# Patient Record
Sex: MALE | Race: WHITE | NOT HISPANIC OR LATINO | Employment: OTHER | URBAN - METROPOLITAN AREA
[De-identification: names, ages, dates, MRNs, and addresses within clinical notes are randomized per-mention and may not be internally consistent; named-entity substitution may affect disease eponyms.]

---

## 2024-03-15 ENCOUNTER — HOSPITAL ENCOUNTER (OUTPATIENT)
Facility: MEDICAL CENTER | Age: 54
End: 2024-03-16
Attending: EMERGENCY MEDICINE | Admitting: STUDENT IN AN ORGANIZED HEALTH CARE EDUCATION/TRAINING PROGRAM

## 2024-03-15 ENCOUNTER — APPOINTMENT (OUTPATIENT)
Dept: RADIOLOGY | Facility: MEDICAL CENTER | Age: 54
End: 2024-03-15
Attending: EMERGENCY MEDICINE

## 2024-03-15 DIAGNOSIS — N20.0 KIDNEY STONE: ICD-10-CM

## 2024-03-15 DIAGNOSIS — N13.2 HYDRONEPHROSIS WITH URINARY OBSTRUCTION DUE TO URETERAL CALCULUS: ICD-10-CM

## 2024-03-15 LAB
ALBUMIN SERPL BCP-MCNC: 5.1 G/DL (ref 3.2–4.9)
ALBUMIN/GLOB SERPL: 1.6 G/DL
ALP SERPL-CCNC: 67 U/L (ref 30–99)
ALT SERPL-CCNC: 10 U/L (ref 2–50)
ANION GAP SERPL CALC-SCNC: 19 MMOL/L (ref 7–16)
APPEARANCE UR: CLEAR
AST SERPL-CCNC: 21 U/L (ref 12–45)
BACTERIA #/AREA URNS HPF: NEGATIVE /HPF
BASOPHILS # BLD AUTO: 0.3 % (ref 0–1.8)
BASOPHILS # BLD: 0.04 K/UL (ref 0–0.12)
BILIRUB SERPL-MCNC: 0.8 MG/DL (ref 0.1–1.5)
BILIRUB UR QL STRIP.AUTO: NEGATIVE
BUN SERPL-MCNC: 21 MG/DL (ref 8–22)
CALCIUM ALBUM COR SERPL-MCNC: 9.3 MG/DL (ref 8.5–10.5)
CALCIUM SERPL-MCNC: 10.2 MG/DL (ref 8.5–10.5)
CHLORIDE SERPL-SCNC: 105 MMOL/L (ref 96–112)
CO2 SERPL-SCNC: 20 MMOL/L (ref 20–33)
COLOR UR: YELLOW
CREAT SERPL-MCNC: 1.4 MG/DL (ref 0.5–1.4)
EOSINOPHIL # BLD AUTO: 0 K/UL (ref 0–0.51)
EOSINOPHIL NFR BLD: 0 % (ref 0–6.9)
EPI CELLS #/AREA URNS HPF: NEGATIVE /HPF
ERYTHROCYTE [DISTWIDTH] IN BLOOD BY AUTOMATED COUNT: 40 FL (ref 35.9–50)
GFR SERPLBLD CREATININE-BSD FMLA CKD-EPI: 60 ML/MIN/1.73 M 2
GLOBULIN SER CALC-MCNC: 3.1 G/DL (ref 1.9–3.5)
GLUCOSE SERPL-MCNC: 165 MG/DL (ref 65–99)
GLUCOSE UR STRIP.AUTO-MCNC: NEGATIVE MG/DL
HCT VFR BLD AUTO: 50.7 % (ref 42–52)
HGB BLD-MCNC: 17.3 G/DL (ref 14–18)
HYALINE CASTS #/AREA URNS LPF: ABNORMAL /LPF
IMM GRANULOCYTES # BLD AUTO: 0.05 K/UL (ref 0–0.11)
IMM GRANULOCYTES NFR BLD AUTO: 0.3 % (ref 0–0.9)
KETONES UR STRIP.AUTO-MCNC: 80 MG/DL
LEUKOCYTE ESTERASE UR QL STRIP.AUTO: ABNORMAL
LIPASE SERPL-CCNC: 13 U/L (ref 11–82)
LYMPHOCYTES # BLD AUTO: 1.41 K/UL (ref 1–4.8)
LYMPHOCYTES NFR BLD: 9 % (ref 22–41)
MCH RBC QN AUTO: 29 PG (ref 27–33)
MCHC RBC AUTO-ENTMCNC: 34.1 G/DL (ref 32.3–36.5)
MCV RBC AUTO: 84.9 FL (ref 81.4–97.8)
MICRO URNS: ABNORMAL
MONOCYTES # BLD AUTO: 0.49 K/UL (ref 0–0.85)
MONOCYTES NFR BLD AUTO: 3.1 % (ref 0–13.4)
MUCOUS THREADS #/AREA URNS HPF: ABNORMAL /HPF
NEUTROPHILS # BLD AUTO: 13.61 K/UL (ref 1.82–7.42)
NEUTROPHILS NFR BLD: 87.3 % (ref 44–72)
NITRITE UR QL STRIP.AUTO: NEGATIVE
NRBC # BLD AUTO: 0 K/UL
NRBC BLD-RTO: 0 /100 WBC (ref 0–0.2)
PH UR STRIP.AUTO: 6 [PH] (ref 5–8)
PLATELET # BLD AUTO: 413 K/UL (ref 164–446)
PMV BLD AUTO: 9.7 FL (ref 9–12.9)
POTASSIUM SERPL-SCNC: 4.3 MMOL/L (ref 3.6–5.5)
PROT SERPL-MCNC: 8.2 G/DL (ref 6–8.2)
PROT UR QL STRIP: 30 MG/DL
RBC # BLD AUTO: 5.97 M/UL (ref 4.7–6.1)
RBC # URNS HPF: ABNORMAL /HPF
RBC UR QL AUTO: ABNORMAL
SODIUM SERPL-SCNC: 144 MMOL/L (ref 135–145)
SP GR UR STRIP.AUTO: 1.02
TRANS CELLS #/AREA URNS HPF: ABNORMAL /HPF
UROBILINOGEN UR STRIP.AUTO-MCNC: 0.2 MG/DL
WBC # BLD AUTO: 15.6 K/UL (ref 4.8–10.8)
WBC #/AREA URNS HPF: ABNORMAL /HPF

## 2024-03-15 PROCEDURE — 700105 HCHG RX REV CODE 258: Performed by: EMERGENCY MEDICINE

## 2024-03-15 PROCEDURE — 93005 ELECTROCARDIOGRAM TRACING: CPT | Performed by: STUDENT IN AN ORGANIZED HEALTH CARE EDUCATION/TRAINING PROGRAM

## 2024-03-15 PROCEDURE — 74176 CT ABD & PELVIS W/O CONTRAST: CPT

## 2024-03-15 PROCEDURE — G0378 HOSPITAL OBSERVATION PER HR: HCPCS

## 2024-03-15 PROCEDURE — 99222 1ST HOSP IP/OBS MODERATE 55: CPT | Performed by: STUDENT IN AN ORGANIZED HEALTH CARE EDUCATION/TRAINING PROGRAM

## 2024-03-15 PROCEDURE — 83690 ASSAY OF LIPASE: CPT

## 2024-03-15 PROCEDURE — 85025 COMPLETE CBC W/AUTO DIFF WBC: CPT

## 2024-03-15 PROCEDURE — 80053 COMPREHEN METABOLIC PANEL: CPT

## 2024-03-15 PROCEDURE — 700111 HCHG RX REV CODE 636 W/ 250 OVERRIDE (IP): Mod: JZ | Performed by: EMERGENCY MEDICINE

## 2024-03-15 PROCEDURE — 700105 HCHG RX REV CODE 258: Performed by: STUDENT IN AN ORGANIZED HEALTH CARE EDUCATION/TRAINING PROGRAM

## 2024-03-15 PROCEDURE — 36415 COLL VENOUS BLD VENIPUNCTURE: CPT

## 2024-03-15 PROCEDURE — 96374 THER/PROPH/DIAG INJ IV PUSH: CPT

## 2024-03-15 PROCEDURE — 99285 EMERGENCY DEPT VISIT HI MDM: CPT

## 2024-03-15 PROCEDURE — 81001 URINALYSIS AUTO W/SCOPE: CPT

## 2024-03-15 PROCEDURE — 96375 TX/PRO/DX INJ NEW DRUG ADDON: CPT

## 2024-03-15 RX ORDER — MORPHINE SULFATE 4 MG/ML
4 INJECTION INTRAVENOUS ONCE
Status: COMPLETED | OUTPATIENT
Start: 2024-03-15 | End: 2024-03-15

## 2024-03-15 RX ORDER — SODIUM CHLORIDE, SODIUM LACTATE, POTASSIUM CHLORIDE, CALCIUM CHLORIDE 600; 310; 30; 20 MG/100ML; MG/100ML; MG/100ML; MG/100ML
1000 INJECTION, SOLUTION INTRAVENOUS ONCE
Status: COMPLETED | OUTPATIENT
Start: 2024-03-15 | End: 2024-03-15

## 2024-03-15 RX ORDER — SODIUM CHLORIDE 9 MG/ML
INJECTION, SOLUTION INTRAVENOUS CONTINUOUS
Status: DISCONTINUED | OUTPATIENT
Start: 2024-03-15 | End: 2024-03-16 | Stop reason: HOSPADM

## 2024-03-15 RX ORDER — KETOROLAC TROMETHAMINE 15 MG/ML
15 INJECTION, SOLUTION INTRAMUSCULAR; INTRAVENOUS ONCE
Status: COMPLETED | OUTPATIENT
Start: 2024-03-15 | End: 2024-03-15

## 2024-03-15 RX ORDER — ONDANSETRON 2 MG/ML
4 INJECTION INTRAMUSCULAR; INTRAVENOUS ONCE
Status: COMPLETED | OUTPATIENT
Start: 2024-03-15 | End: 2024-03-15

## 2024-03-15 RX ORDER — ONDANSETRON 2 MG/ML
4 INJECTION INTRAMUSCULAR; INTRAVENOUS EVERY 4 HOURS PRN
Status: DISCONTINUED | OUTPATIENT
Start: 2024-03-15 | End: 2024-03-16 | Stop reason: HOSPADM

## 2024-03-15 RX ORDER — MORPHINE SULFATE 4 MG/ML
4 INJECTION INTRAVENOUS EVERY 4 HOURS PRN
Status: DISCONTINUED | OUTPATIENT
Start: 2024-03-15 | End: 2024-03-16 | Stop reason: HOSPADM

## 2024-03-15 RX ORDER — ACETAMINOPHEN 325 MG/1
650 TABLET ORAL EVERY 6 HOURS PRN
Status: DISCONTINUED | OUTPATIENT
Start: 2024-03-15 | End: 2024-03-16 | Stop reason: HOSPADM

## 2024-03-15 RX ADMIN — SODIUM CHLORIDE: 9 INJECTION, SOLUTION INTRAVENOUS at 23:39

## 2024-03-15 RX ADMIN — SODIUM CHLORIDE, POTASSIUM CHLORIDE, SODIUM LACTATE AND CALCIUM CHLORIDE 1000 ML: 600; 310; 30; 20 INJECTION, SOLUTION INTRAVENOUS at 18:25

## 2024-03-15 RX ADMIN — MORPHINE SULFATE 4 MG: 4 INJECTION, SOLUTION INTRAMUSCULAR; INTRAVENOUS at 18:32

## 2024-03-15 RX ADMIN — KETOROLAC TROMETHAMINE 15 MG: 15 INJECTION, SOLUTION INTRAMUSCULAR; INTRAVENOUS at 19:43

## 2024-03-15 RX ADMIN — ONDANSETRON 4 MG: 2 INJECTION INTRAMUSCULAR; INTRAVENOUS at 18:31

## 2024-03-15 ASSESSMENT — ENCOUNTER SYMPTOMS
NEUROLOGICAL NEGATIVE: 1
VOMITING: 1
CARDIOVASCULAR NEGATIVE: 1
RESPIRATORY NEGATIVE: 1
FLANK PAIN: 1
PSYCHIATRIC NEGATIVE: 1
ABDOMINAL PAIN: 0
NAUSEA: 1
CONSTITUTIONAL NEGATIVE: 1

## 2024-03-15 ASSESSMENT — LIFESTYLE VARIABLES
HOW MANY TIMES IN THE PAST YEAR HAVE YOU HAD 5 OR MORE DRINKS IN A DAY: 0
AVERAGE NUMBER OF DAYS PER WEEK YOU HAVE A DRINK CONTAINING ALCOHOL: 0
TOTAL SCORE: 0
TOTAL SCORE: 0
HAVE YOU EVER FELT YOU SHOULD CUT DOWN ON YOUR DRINKING: NO
EVER HAD A DRINK FIRST THING IN THE MORNING TO STEADY YOUR NERVES TO GET RID OF A HANGOVER: NO
DOES PATIENT WANT TO STOP DRINKING: NO
CONSUMPTION TOTAL: NEGATIVE
EVER FELT BAD OR GUILTY ABOUT YOUR DRINKING: NO
ALCOHOL_USE: YES
ON A TYPICAL DAY WHEN YOU DRINK ALCOHOL HOW MANY DRINKS DO YOU HAVE: 0
HAVE PEOPLE ANNOYED YOU BY CRITICIZING YOUR DRINKING: NO
TOTAL SCORE: 0

## 2024-03-15 ASSESSMENT — COGNITIVE AND FUNCTIONAL STATUS - GENERAL
DAILY ACTIVITIY SCORE: 24
SUGGESTED CMS G CODE MODIFIER MOBILITY: CH
MOBILITY SCORE: 24
SUGGESTED CMS G CODE MODIFIER DAILY ACTIVITY: CH

## 2024-03-15 ASSESSMENT — PATIENT HEALTH QUESTIONNAIRE - PHQ9
SUM OF ALL RESPONSES TO PHQ9 QUESTIONS 1 AND 2: 0
1. LITTLE INTEREST OR PLEASURE IN DOING THINGS: NOT AT ALL
2. FEELING DOWN, DEPRESSED, IRRITABLE, OR HOPELESS: NOT AT ALL

## 2024-03-15 ASSESSMENT — FIBROSIS 4 INDEX: FIB4 SCORE: 0.84

## 2024-03-15 ASSESSMENT — PAIN DESCRIPTION - PAIN TYPE
TYPE: ACUTE PAIN
TYPE: ACUTE PAIN

## 2024-03-15 NOTE — ED TRIAGE NOTES
.Teo Gauthier  .  Chief Complaint   Patient presents with    Abdominal Pain     Patient c/o RLQ pain radiating to back x 1 day.     N/V     X 1 day     Patient to triage with above complaint. Patient appears uncomfortable in triage.   Urine sample sent to lab.     Patient to lobby and instructed to inform staff of any needs.

## 2024-03-16 ENCOUNTER — PHARMACY VISIT (OUTPATIENT)
Dept: PHARMACY | Facility: MEDICAL CENTER | Age: 54
End: 2024-03-16
Payer: COMMERCIAL

## 2024-03-16 VITALS
HEIGHT: 72 IN | RESPIRATION RATE: 16 BRPM | WEIGHT: 158.07 LBS | OXYGEN SATURATION: 94 % | SYSTOLIC BLOOD PRESSURE: 102 MMHG | HEART RATE: 58 BPM | BODY MASS INDEX: 21.41 KG/M2 | DIASTOLIC BLOOD PRESSURE: 64 MMHG | TEMPERATURE: 98.6 F

## 2024-03-16 LAB — EKG IMPRESSION: NORMAL

## 2024-03-16 PROCEDURE — 99243 OFF/OP CNSLTJ NEW/EST LOW 30: CPT | Performed by: UROLOGY

## 2024-03-16 PROCEDURE — G0378 HOSPITAL OBSERVATION PER HR: HCPCS

## 2024-03-16 PROCEDURE — 93010 ELECTROCARDIOGRAM REPORT: CPT | Performed by: INTERNAL MEDICINE

## 2024-03-16 PROCEDURE — 99239 HOSP IP/OBS DSCHRG MGMT >30: CPT | Mod: GC | Performed by: INTERNAL MEDICINE

## 2024-03-16 PROCEDURE — RXMED WILLOW AMBULATORY MEDICATION CHARGE

## 2024-03-16 RX ORDER — TAMSULOSIN HYDROCHLORIDE 0.4 MG/1
0.4 CAPSULE ORAL DAILY
Qty: 30 CAPSULE | Refills: 0 | Status: SHIPPED | OUTPATIENT
Start: 2024-03-16

## 2024-03-16 RX ORDER — KETOROLAC TROMETHAMINE 10 MG/1
10 TABLET, FILM COATED ORAL EVERY 6 HOURS PRN
Qty: 20 TABLET | Refills: 0 | Status: SHIPPED | OUTPATIENT
Start: 2024-03-16 | End: 2024-03-21

## 2024-03-16 ASSESSMENT — PAIN DESCRIPTION - PAIN TYPE: TYPE: ACUTE PAIN

## 2024-03-16 NOTE — DISCHARGE INSTRUCTIONS
Discharge Instructions per OZZIE White.     -Start taking 0.4 mg tamsulosin p.o. daily.  -Take Toradol 10 mg p.o. every 6 hours as needed for pain.  -Urology should be calling you on Monday to schedule outpatient surgery.  -Use filter basket for urine in case you pass a stone.  If you are able to pass the stone keep the stone and give to urology team to be sent off to pathology.  -Stay well-hydrated.  -If you are unable to void call the urology team and/or come to the emergency department.     DIET: As tolerated     ACTIVITY: Tolerated     DIAGNOSIS: Right flank pain     Return to ER if you experience any numbness and tingling, palpitations, chest pain, or shortness of breath.

## 2024-03-16 NOTE — CARE PLAN
The patient is Stable - Low risk of patient condition declining or worsening    Shift Goals  Clinical Goals: Surgery, pain control  Patient Goals: surgery, DC  Family Goals: NA    Progress made toward(s) clinical / shift goals:    Problem: Pain - Standard  Goal: Alleviation of pain or a reduction in pain to the patient’s comfort goal  Description: Target End Date:  Prior to discharge or change in level of care    Document on Vitals flowsheet    1.  Document pain using the appropriate pain scale per order or unit policy  2.  Educate and implement non-pharmacologic comfort measures (i.e. relaxation, distraction, massage, cold/heat therapy, etc.)  3.  Pain management medications as ordered  4.  Reassess pain after pain med administration per policy  5.  If opiods administered assess patient's response to pain medication is appropriate per POSS sedation scale  6.  Follow pain management plan developed in collaboration with patient and interdisciplinary team (including palliative care or pain specialists if applicable)  Outcome: Progressing       Patient is not progressing towards the following goals:

## 2024-03-16 NOTE — HOSPITAL COURSE
Judson Gauthier is a 53 y.o. male who presented 3/15/2024 with right flank pain.      For the last year, patient has been having on and off right flank pain that he describes as sharp and mild.  He has never been evaluated for his right flank pain.  For the last few days, patient noted to have worsening right flank pain, and this morning patient noted to have been nauseous and vomited several times due to the pain.  He decided to come to ER for evaluation. Patient denies any medical history.  He occasionally drinks a beer, denies taking any medications, denies tobacco, denies illicit drug use.     In ER, patient found to have white blood cell count 15,000.  UA negative for UTI.  CAT scan showing 7.8 mm nephrolithiasis resulting in obstructive changes.  Urology consulted.    Patient seen and examined and in no apparent distress.  He states that he is feeling much better with no pain.  Urology came to see the patient today and discussed medical expulsive therapy, ureteral stenting, and laser lithotripsy.  It was decided by the patient that they would discharge today and schedule outpatient for elective cystoscopy/ureteroscopic/laser lithotripsy.  Surgery to call patient on Monday to schedule surgery.    Discussed with the patient that he will be going home on Flomax 0.4 mg p.o. daily and p.o.Toradol 10 mg every 6 hours for mild to moderate pain.  All questions answered at this time and patient states understanding.  Patient will be discharged in good standing.

## 2024-03-16 NOTE — ED PROVIDER NOTES
ER Provider Note    Scribed for Billy Apple M.d. by Isidro Lerma. 3/15/2024  6:20 PM    Primary Care Provider: Pcp Pt States None    CHIEF COMPLAINT  Chief Complaint   Patient presents with    Abdominal Pain     Patient c/o RLQ pain radiating to back x 1 day.     N/V     X 1 day         HPI/ROS    OUTSIDE HISTORIAN(S):  Family was present at bedside but did not provide information about the patient.    Teo Gauthier is a 53 y.o. male who presents to the ED complaining of abdominal pain onset yesterday. Patient ate a protein pancake when he began to feel the pain. He reports that the pain is in the right lower quadrant. Patient does not have pain, burning, or blood when he urinates. Patient has vomiting since yesterday. Denies fever. He believes he has had a kidney stone before but does not remember exactly. He has not had surgery in his abdomen before. Patient attempted to take Ibuprofen 800 mg but vomited soon after taking it.    PAST MEDICAL HISTORY  History reviewed. No pertinent past medical history.    SURGICAL HISTORY  History reviewed. No pertinent surgical history.    FAMILY HISTORY  History reviewed. No pertinent family history.    SOCIAL HISTORY   reports that he has never smoked. He has never used smokeless tobacco. He reports current drug use. Drug: Intravenous. He reports that he does not drink alcohol.    CURRENT MEDICATIONS  Previous Medications    No medications noted.     ALLERGIES  Patient has no known allergies.    PHYSICAL EXAM  /80   Pulse (!) 58   Temp 36.4 °C (97.6 °F) (Temporal)   Resp 17   Wt 80.3 kg (177 lb)   SpO2 99% Comment: RA      Constitutional: Alert. Uncomfortable appearing.  HENT: No signs of trauma, Bilateral external ears normal, Nose normal. Uvula midline.   Eyes: Pupils are equal and reactive, Conjunctiva normal, Non-icteric.   Neck: Normal range of motion, No tenderness, Supple, No stridor.   Lymphatic: No lymphadenopathy noted.   Cardiovascular: Regular  rate and rhythm, no murmurs.   Thorax & Lungs: Normal breath sounds, No respiratory distress, No wheezing, No chest tenderness.   Abdomen: Bowel sounds normal, Soft, No peritoneal signs, No masses, No pulsatile masses. Right lower quadrant tenderness.  Skin: Warm, Dry, No erythema, No rash.   Back: No bony tenderness, No CVA tenderness.   Extremities: Intact distal pulses, No edema, No tenderness, No cyanosis.  Musculoskeletal: Good range of motion in all major joints. No tenderness to palpation or major deformities noted.   Neurologic: Alert , Normal motor function, Normal sensory function, No focal deficits noted.   Psychiatric: Affect normal, Judgment normal, Mood normal.     DIAGNOSTIC STUDIES    Labs:   Results for orders placed or performed during the hospital encounter of 03/15/24   CBC WITH DIFFERENTIAL   Result Value Ref Range    WBC 15.6 (H) 4.8 - 10.8 K/uL    RBC 5.97 4.70 - 6.10 M/uL    Hemoglobin 17.3 14.0 - 18.0 g/dL    Hematocrit 50.7 42.0 - 52.0 %    MCV 84.9 81.4 - 97.8 fL    MCH 29.0 27.0 - 33.0 pg    MCHC 34.1 32.3 - 36.5 g/dL    RDW 40.0 35.9 - 50.0 fL    Platelet Count 413 164 - 446 K/uL    MPV 9.7 9.0 - 12.9 fL    Neutrophils-Polys 87.30 (H) 44.00 - 72.00 %    Lymphocytes 9.00 (L) 22.00 - 41.00 %    Monocytes 3.10 0.00 - 13.40 %    Eosinophils 0.00 0.00 - 6.90 %    Basophils 0.30 0.00 - 1.80 %    Immature Granulocytes 0.30 0.00 - 0.90 %    Nucleated RBC 0.00 0.00 - 0.20 /100 WBC    Neutrophils (Absolute) 13.61 (H) 1.82 - 7.42 K/uL    Lymphs (Absolute) 1.41 1.00 - 4.80 K/uL    Monos (Absolute) 0.49 0.00 - 0.85 K/uL    Eos (Absolute) 0.00 0.00 - 0.51 K/uL    Baso (Absolute) 0.04 0.00 - 0.12 K/uL    Immature Granulocytes (abs) 0.05 0.00 - 0.11 K/uL    NRBC (Absolute) 0.00 K/uL   COMP METABOLIC PANEL   Result Value Ref Range    Sodium 144 135 - 145 mmol/L    Potassium 4.3 3.6 - 5.5 mmol/L    Chloride 105 96 - 112 mmol/L    Co2 20 20 - 33 mmol/L    Anion Gap 19.0 (H) 7.0 - 16.0    Glucose 165 (H) 65  - 99 mg/dL    Bun 21 8 - 22 mg/dL    Creatinine 1.40 0.50 - 1.40 mg/dL    Calcium 10.2 8.5 - 10.5 mg/dL    Correct Calcium 9.3 8.5 - 10.5 mg/dL    AST(SGOT) 21 12 - 45 U/L    ALT(SGPT) 10 2 - 50 U/L    Alkaline Phosphatase 67 30 - 99 U/L    Total Bilirubin 0.8 0.1 - 1.5 mg/dL    Albumin 5.1 (H) 3.2 - 4.9 g/dL    Total Protein 8.2 6.0 - 8.2 g/dL    Globulin 3.1 1.9 - 3.5 g/dL    A-G Ratio 1.6 g/dL   LIPASE   Result Value Ref Range    Lipase 13 11 - 82 U/L   URINALYSIS    Specimen: Urine   Result Value Ref Range    Color Yellow     Character Clear     Specific Gravity 1.025 <1.035    Ph 6.0 5.0 - 8.0    Glucose Negative Negative mg/dL    Ketones 80 (A) Negative mg/dL    Protein 30 (A) Negative mg/dL    Bilirubin Negative Negative    Urobilinogen, Urine 0.2 Negative    Nitrite Negative Negative    Leukocyte Esterase Small (A) Negative    Occult Blood Small (A) Negative    Micro Urine Req Microscopic    URINE MICROSCOPIC (W/UA)   Result Value Ref Range    WBC 5-10 (A) /hpf    RBC 10-20 (A) /hpf    Bacteria Negative None /hpf    Epithelial Cells Negative /hpf    Trans Epithelial Cells Rare /hpf    Mucous Threads Many /hpf    Hyaline Cast 3-5 (A) /lpf   ESTIMATED GFR   Result Value Ref Range    GFR (CKD-EPI) 60 >60 mL/min/1.73 m 2     Radiology:   The attending emergency physician has independently interpreted the diagnostic imaging associated with this visit and am waiting the final reading from the radiologist.   Preliminary interpretation is a follows: +renal stone and hydro  Radiologist interpretation:   CT-RENAL COLIC EVALUATION(A/P W/O)   Final Result         1.  7.8 mm proximal right ureteral stone resulting in right urinary outflow tract obstructive changes.   2.  Small fat-containing umbilical hernia   3.  Diverticulosis        COURSE & MEDICAL DECISION MAKING         6:30 PM - Patient seen and examined at bedside for abdominal pain. Discussed plan of care, including imaging, lab work, and medication. Patient  agrees to the plan of care. The patient will be medicated with LR bolus, Morphine 4mg/ml injection 4 mg, and Zofran 4 mg injection. Ordered for CT-Abdomen-Pelvis With, CBC w/ Diff, CMP, Lipase, UA, and Urine Microscopic to evaluate his symptoms.     6:46 PM - Paged Radiologist to change CT With to CT Renal Colic.      INITIAL ASSESSMENT, COURSE AND PLAN  Care Narrative:   53-year-old male with significant hydronephrosis with large ureteral stone with 1 out of 3 chance of passing stone without surgical intervention with elevated creatinine at 1.4 today and significant amount of pain  Given concern for injury to kidney and lack of anticipated passage plan for admission and lithotripsy  I discussed this case with on-call  Urologist Dr. Barnhart who agrees n.p.o. at midnight and plan for OR in a.m.  Patient to be admitted to Dr. Apple in guarded condition    DISPOSITION AND DISCUSSIONS  I have discussed management of the patient with the following physicians and WILLIAM's:  Ambika Henning    Discussion of management with other [facility] or appropriate source(s): Radiologist Change CT order          FINAL DIANGOSIS  1. Kidney stone    2. Hydronephrosis with urinary obstruction due to ureteral calculus        Isidro DEL VALLE (Scribe), am scribing for, and in the presence of, Azael Gil M.D..    Electronically signed by: Isidro Lerma (Scribe), 3/15/2024    Azael DEL VALLE M.D. personally performed the services described in this documentation, as scribed by Isidro Lerma in my presence, and it is both accurate and complete.    The note accurately reflects work and decisions made by me.  Azael Gil M.D.  3/15/2024  11:11 PM

## 2024-03-16 NOTE — CONSULTS
Chief Complaint: right flank pain    HPI: Judson Gauthier is a 53 y.o. male in good overall health, who presented to the ED last night with acute right flank pain and nausea. He was admitted for pain control.    This morning, he feels much improved. No pain, nausea, or vomiting. No fevers or chills. Urinating without dysuria, frequency, or urgency. Has not required prn analgesia.     He says that he has had more mild episodes like this over the last 7 years, and may have spontaneously passed stones before. This is the first time he came to the hospital for such pain.      Symptoms:  Frequency: no  Urgency: no  Nocturia: no  Stress incontinence: no  Urge incontinence: no  Dysuria: no  Gross hematuria: no  Weakened stream: no  Strain to empty: no      Past Medical History:  History reviewed. No pertinent past medical history.    Past Surgical History:  History reviewed. No pertinent surgical history.    Family History:  History reviewed. No pertinent family history.    Social History:  Social History     Socioeconomic History    Marital status: Single     Spouse name: Not on file    Number of children: Not on file    Years of education: Not on file    Highest education level: Not on file   Occupational History    Not on file   Tobacco Use    Smoking status: Never    Smokeless tobacco: Never   Vaping Use    Vaping Use: Never used   Substance and Sexual Activity    Alcohol use: No    Drug use: Yes     Types: Intravenous     Comment: marijuana    Sexual activity: Not on file   Other Topics Concern    Not on file   Social History Narrative    Not on file     Social Determinants of Health     Financial Resource Strain: Not on file   Food Insecurity: Not on file   Transportation Needs: Not on file   Physical Activity: Not on file   Stress: Not on file   Social Connections: Not on file   Intimate Partner Violence: Not on file   Housing Stability: Not on file       Medications:  Current Facility-Administered Medications  "  Medication Dose Route Frequency Provider Last Rate Last Admin    NS infusion   Intravenous Continuous Billy Apple M.D. 100 mL/hr at 03/15/24 2339 New Bag at 03/15/24 2339    acetaminophen (Tylenol) tablet 650 mg  650 mg Oral Q6HRS PRN Billy Apple M.D.        morphine 4 MG/ML injection 4 mg  4 mg Intravenous Q4HRS PRN Billy Apple M.D.        ondansetron (Zofran) syringe/vial injection 4 mg  4 mg Intravenous Q4HRS PRN Billy Apple M.D.           Allergies:  No Known Allergies    Review of Systems:  Constitutional: Negative for fever, chills and malaise/fatigue.   HENT: Negative for congestion.    Eyes: Negative for pain.   Respiratory: Negative for cough and shortness of breath.    Cardiovascular: Negative for leg swelling.   Gastrointestinal: Negative for nausea, vomiting, abdominal pain and diarrhea.   Genitourinary: Negative for dysuria and hematuria.   Skin: Negative for rash.   Neurological: Negative for dizziness, focal weakness and headaches.   Endo/Heme/Allergies: Does not bruise/bleed easily.   Psychiatric/Behavioral: Negative for depression.  The patient is not nervous/anxious.        Physical Exam:  Vitals:    03/16/24 0656   BP: 102/64   Pulse: (!) 58   Resp: 16   Temp: 37 °C (98.6 °F)   SpO2: 94%       GENERAL: well appearing, well nourished, NAD  RESP: respiratory effort normal  ABDOMEN: soft, nontender, nondistended, no masses or organomegaly  HERNIAS: no hernias found on exam  SKIN/LYMPH: normal coloration and turgor, no suspicious skin lesions noted  NEURO/PSYCH: alert, oriented, normal speech, no focal findings or movement disorder noted  EXTREMITIES: peripheral pulses normal, no pedal edema, no clubbing or cyanosis      Data Review:    Labs:  POCT UA No results found for: \"POCCOLOR\", \"POCAPPEAR\", \"POCLEUKEST\", \"POCNITRITE\", \"POCUROBILIGE\", \"POCPROTEIN\", \"POCURPH\", \"POCBLOOD\", \"POCSPGRV\", \"POCKETONES\", \"POCBILIRUBIN\", \"POCGLUCUA\"   CBC   Lab Results   Component Value " Date/Time    WBC 15.6 (H) 03/15/2024 1605    RBC 5.97 03/15/2024 1605    HEMOGLOBIN 17.3 03/15/2024 1605    HEMATOCRIT 50.7 03/15/2024 1605    MCV 84.9 03/15/2024 1605    MCH 29.0 03/15/2024 1605    MCHC 34.1 03/15/2024 1605    RDW 40.0 03/15/2024 1605    MPV 9.7 03/15/2024 1605    LYMPHOCYTES 9.00 (L) 03/15/2024 1605    LYMPHS 1.41 03/15/2024 1605    MONOCYTES 3.10 03/15/2024 1605    MONOS 0.49 03/15/2024 1605    EOSINOPHILS 0.00 03/15/2024 1605    EOS 0.00 03/15/2024 1605    BASOPHILS 0.30 03/15/2024 1605    BASO 0.04 03/15/2024 1605    NRBC 0.00 03/15/2024 1605     CMP   Lab Results   Component Value Date/Time    SODIUM 144 03/15/2024 1605    POTASSIUM 4.3 03/15/2024 1605    CHLORIDE 105 03/15/2024 1605    CO2 20 03/15/2024 1605    ANION 19.0 (H) 03/15/2024 1605    GLUCOSE 165 (H) 03/15/2024 1605    BUN 21 03/15/2024 1605    CREATININE 1.40 03/15/2024 1605    GFRCKD 60 03/15/2024 1605    CALCIUM 10.2 03/15/2024 1605    CORRCALC 9.3 03/15/2024 1605    ASTSGOT 21 03/15/2024 1605    ALTSGPT 10 03/15/2024 1605    ALKPHOSPHAT 67 03/15/2024 1605    TBILIRUBIN 0.8 03/15/2024 1605    ALBUMIN 5.1 (H) 03/15/2024 1605    TOTPROTEIN 8.2 03/15/2024 1605    GLOBULIN 3.1 03/15/2024 1605    AGRATIO 1.6 03/15/2024 1605       Imaging:   CT-RENAL COLIC EVALUATION(A/P W/O)  Order: [id number]   Status: Final result       Visible to patient: No (inaccessible in MyChart)       Next appt: None    0 Result Notes  Details    Reading Physician Reading Date Result Priority   Bree Ortiz M.D.  [phone] 3/15/2024      Narrative & Impression    3/15/2024 9:02 PM     HISTORY/REASON FOR EXAM:  Abdominal Pain, Nausea/Vomiting concern for right side stone.     TECHNIQUE/EXAM DESCRIPTION:     CT scan of the abdomen and pelvis without contrast.     Noncontrast helical scanning was obtained from the diaphragmatic domes through the pubic symphysis.     Low dose optimization technique was utilized for this CT exam including automated exposure  control and adjustment of the mA and/or kV according to patient size.     COMPARISON: None.     FINDINGS:     Lower Chest: Linear densities in the bilateral lung bases are seen favoring changes of atelectasis.     Liver: Normal.     Spleen: Unremarkable.     Pancreas: Unremarkable.     Gallbladder: No calcified stones.     Biliary: Nondilated.     Adrenal glands: Normal.     Kidneys: Right-sided perinephric fat stranding is seen. There is right hydronephrosis and proximal hydroureter identified with 7.8 mm proximal right ureteral stone measuring 826 Hounsfield units.     Bowel: No obstruction or acute inflammation. Scattered colonic diverticula are seen. The appendix appears within normal limits.     Lymph nodes: No adenopathy.     Vasculature: Unremarkable.     Peritoneum: Unremarkable without ascites. 5 mm fat-containing umbilical hernia is seen.     Musculoskeletal: No acute or destructive process. Grade 1 spondylolisthesis L4 and L5 is seen with bilateral L5 pars defects.     Pelvis: No adenopathy or free fluid.     IMPRESSION:        1.  7.8 mm proximal right ureteral stone resulting in right urinary outflow tract obstructive changes.  2.  Small fat-containing umbilical hernia  3.  Diverticulosis       Assessment: 53 y.o. male with a history of right renal colic due to a 7.8 mm right ureteral stone. He feels very well this morning.    We discussed management with medical expulsive therapy, ureteral stenting, and laser lithotripsy in detail. He is feeling much improved today, and after some consideration the patient and his partner elect to schedule elective cystoscopy/ureteroscopy/laser lithotripsy.      Plan:    -Ok to discharge home today  -Discharge with instructions to stay well hydrated, take 0.4 mg tamsulosin daily, prn PO toradol  -Filter basket to filter urine; if passes stone we'll cancel planned surgery  -I will schedule his surgery as soon as possible; no further referral necessary      Ashish  MD Milady

## 2024-03-16 NOTE — PROGRESS NOTES
4 Eyes Skin Assessment Completed by CAROLYN Arreola and CAROLYN Roberson.    Head WDL  Ears WDL  Nose WDL  Mouth WDL  Neck WDL  Breast/Chest WDL  Shoulder Blades WDL  Spine WDL  (R) Arm/Elbow/Hand WDL  (L) Arm/Elbow/Hand WDL  Abdomen WDL  Groin WDL  Scrotum/Coccyx/Buttocks WDL  (R) Leg WDL  (L) Leg WDL  (R) Heel/Foot/Toe WDL  (L) Heel/Foot/Toe WDL          Devices In Places Pulse Ox      Interventions In Place Pillows    Possible Skin Injury No    Pictures Uploaded Into Epic N/A  Wound Consult Placed N/A  RN Wound Prevention Protocol Ordered No

## 2024-03-16 NOTE — ED NOTES
Pt resting quietly in bed with spouse at bedside. No c/o pain or discomfort at this time. Pt waiting for CT.

## 2024-03-16 NOTE — CARE PLAN
The patient is Stable - Low risk of patient condition declining or worsening    Shift Goals  Clinical Goals: Pain control, NPO for possible lithotripsy  Patient Goals: Rest, pain relief  Family Goals: Not at bedside    Progress made toward(s) clinical / shift goals:      Problem: Knowledge Deficit - Standard  Goal: Patient and family/care givers will demonstrate understanding of plan of care, disease process/condition, diagnostic tests and medications  Description: Target End Date:  1-3 days or as soon as patient condition allows    Document in Patient Education    1.  Patient and family/caregiver oriented to unit, equipment, visitation policy and means for communicating concern  2.  Complete/review Learning Assessment  3.  Assess knowledge level of disease process/condition, treatment plan, diagnostic tests and medications  4.  Explain disease process/condition, treatment plan, diagnostic tests and medications  Outcome: Progressing     Problem: Pain - Standard  Goal: Alleviation of pain or a reduction in pain to the patient’s comfort goal  Description: Target End Date:  Prior to discharge or change in level of care    Document on Vitals flowsheet    1.  Document pain using the appropriate pain scale per order or unit policy  2.  Educate and implement non-pharmacologic comfort measures (i.e. relaxation, distraction, massage, cold/heat therapy, etc.)  3.  Pain management medications as ordered  4.  Reassess pain after pain med administration per policy  5.  If opiods administered assess patient's response to pain medication is appropriate per POSS sedation scale  6.  Follow pain management plan developed in collaboration with patient and interdisciplinary team (including palliative care or pain specialists if applicable)  Outcome: Progressing       Patient is not progressing towards the following goals:

## 2024-03-16 NOTE — PROGRESS NOTES
Pt arrived to the unit. Assumed care of pt. Pt A&Ox4, NAD, VSS on RA. Denies pain. Call light in reach. Bed in lowest position. Care of plan discussed with pt with pt agreeing to care of plan. Communication board updated. All questions answered. Assessment completed.

## 2024-03-16 NOTE — DISCHARGE SUMMARY
Discharge Summary    CHIEF COMPLAINT ON ADMISSION  Chief Complaint   Patient presents with    Abdominal Pain     Patient c/o RLQ pain radiating to back x 1 day.     N/V     X 1 day       Reason for Admission  Flank Pain     Admission Date  3/15/2024    CODE STATUS  Full Code    HPI & HOSPITAL COURSE  Judson Gauthier is a 53 y.o. male who presented 3/15/2024 with right flank pain.      For the last year, patient has been having on and off right flank pain that he describes as sharp and mild.  He has never been evaluated for his right flank pain.  For the last few days, patient noted to have worsening right flank pain, and this morning patient noted to have been nauseous and vomited several times due to the pain.  He decided to come to ER for evaluation. Patient denies any medical history.  He occasionally drinks a beer, denies taking any medications, denies tobacco, denies illicit drug use.     In ER, patient found to have white blood cell count 15,000.  UA negative for UTI.  CAT scan showing 7.8 mm nephrolithiasis resulting in obstructive changes.  Urology consulted.    Patient seen and examined and in no apparent distress.  He states that he is feeling much better with no pain.  Urology came to see the patient today and discussed medical expulsive therapy, ureteral stenting, and laser lithotripsy.  It was decided by the patient that they would discharge today and schedule outpatient for elective cystoscopy/ureteroscopic/laser lithotripsy.  Surgery to call patient on Monday to schedule surgery.    Discussed with the patient that he will be going home on Flomax 0.4 mg p.o. daily and p.o.Toradol 10 mg every 6 hours for mild to moderate pain.  All questions answered at this time and patient states understanding.  Patient will be discharged in good standing.    Therefore, he is discharged in good and stable condition to home with close outpatient follow-up.    The patient recovered much more quickly than anticipated on  admission.    Discharge Date  03/16/24      FOLLOW UP ITEMS POST DISCHARGE  Discharge Instructions per OZZIE White.     -Start taking 0.4 mg tamsulosin p.o. daily.  -Take Toradol 10 mg p.o. every 6 hours as needed for pain.  -Urology should be calling you on Monday to schedule outpatient surgery.  -Use filter basket for urine in case you pass a stone.  If you are able to pass the stone keep the stone and give to urology team to be sent off to pathology.  -Stay well-hydrated.  -If you are unable to void call the urology team and/or come to the emergency department.     DIET: As tolerated     ACTIVITY: Tolerated     DIAGNOSIS: Right flank pain     Return to ER if you experience any numbness and tingling, palpitations, chest pain, or shortness of breath.      DISCHARGE DIAGNOSES  Principal Problem:    Nephrolithiasis (POA: Yes)  Resolved Problems:    * No resolved hospital problems. *      FOLLOW UP  Ashish Henning M.D.  [address] [place] [phone]  [phone]    Follow up      MEDICATIONS ON DISCHARGE     Medication List        START taking these medications        Instructions   ketorolac 10 MG Tabs  Commonly known as: Toradol   Take 1 Tablet by mouth every 6 hours as needed for Mild Pain or Moderate Pain for up to 5 days.  Dose: 10 mg     tamsulosin 0.4 MG capsule  Commonly known as: Flomax   Take 1 Capsule by mouth every day.  Dose: 0.4 mg              Allergies  No Known Allergies    DIET  Orders Placed This Encounter   Procedures    Diet NPO Restrict to: Sips with Medications     Standing Status:   Standing     Number of Occurrences:   8     Order Specific Question:   Diet NPO Restrict to:     Answer:   Sips with Medications [3]       ACTIVITY  As tolerated.  Weight bearing as tolerated    CONSULTATIONS  Urology    PROCEDURES  None    IMAGING  CT-RENAL COLIC EVALUATION(A/P W/O)   Final Result         1.  7.8 mm proximal right ureteral stone resulting in right urinary outflow  tract obstructive changes.   2.  Small fat-containing umbilical hernia   3.  Diverticulosis        LABORATORY  Lab Results   Component Value Date    SODIUM 144 03/15/2024    POTASSIUM 4.3 03/15/2024    CHLORIDE 105 03/15/2024    CO2 20 03/15/2024    GLUCOSE 165 (H) 03/15/2024    BUN 21 03/15/2024    CREATININE 1.40 03/15/2024        Lab Results   Component Value Date    WBC 15.6 (H) 03/15/2024    HEMOGLOBIN 17.3 03/15/2024    HEMATOCRIT 50.7 03/15/2024    PLATELETCT 413 03/15/2024

## 2024-03-16 NOTE — ASSESSMENT & PLAN NOTE
Found to have 7.8 mm nephrolithiasis resulting in obstructive changes  No evidence of UTI at this time  Fluids  Morphine  Urology consulted, patient will go for lithotripsy in a.m.

## 2024-03-16 NOTE — H&P
Hospital Medicine History & Physical Note    Date of Service  3/15/2024    Primary Care Physician  Pcp Pt States None    Consultants  Nephrology    Code Status  Full Code    Chief Complaint  Chief Complaint   Patient presents with    Abdominal Pain     Patient c/o RLQ pain radiating to back x 1 day.     N/V     X 1 day       History of Presenting Illness  Judson Gauthier is a 53 y.o. male who presented 3/15/2024 with right flank pain.  For the last year, patient has been having on and off right flank pain that he describes as sharp and mild.  He has never been evaluated for his right flank pain.  For the last few days, patient noted to have worsening right flank pain, and this morning patient noted to have been nauseous and vomited several times due to the pain.  He decided to come to ER for evaluation.    Patient denies any medical history.  He occasionally drinks a beer, denies taking any medications, denies tobacco, denies illicit drug use.    In ER, patient found to have white blood cell count 15,000.  UA negative for UTI.  CAT scan showing 7.8 mm nephrolithiasis resulting in obstructive changes.  Urology consulted, patient will go for lithotripsy in AM.    I discussed the plan of care with patient.    Review of Systems  Review of Systems   Constitutional: Negative.    Respiratory: Negative.     Cardiovascular: Negative.    Gastrointestinal:  Positive for nausea and vomiting. Negative for abdominal pain.   Genitourinary:  Positive for flank pain.   Skin: Negative.    Neurological: Negative.    Endo/Heme/Allergies: Negative.    Psychiatric/Behavioral: Negative.         Past Medical History   has no past medical history on file.    Surgical History   has no past surgical history on file.     Family History  family history is not on file.   Family history reviewed with patient. There is no family history that is pertinent to the chief complaint.     Social History   reports that he has never smoked. He has never  used smokeless tobacco. He reports current drug use. Drug: Intravenous. He reports that he does not drink alcohol.    Allergies  No Known Allergies    Medications  None       Physical Exam  Temp:  [36.4 °C (97.6 °F)] 36.4 °C (97.6 °F)  Pulse:  [54-97] 65  Resp:  [16-33] 23  BP: (104-129)/(66-81) 106/70  SpO2:  [86 %-99 %] 94 %  Blood Pressure: 106/70   Temperature: 36.4 °C (97.6 °F)   Pulse: 65   Respiration: (!) 23   Pulse Oximetry: 94 %       Physical Exam  Constitutional:       Appearance: Normal appearance. He is normal weight.   HENT:      Head: Normocephalic.      Nose: Nose normal.      Mouth/Throat:      Mouth: Mucous membranes are moist.   Eyes:      Pupils: Pupils are equal, round, and reactive to light.   Cardiovascular:      Rate and Rhythm: Normal rate and regular rhythm.   Pulmonary:      Effort: Pulmonary effort is normal.      Breath sounds: Normal breath sounds.   Abdominal:      General: Abdomen is flat. Bowel sounds are normal.      Palpations: Abdomen is soft.      Tenderness: There is no right CVA tenderness or left CVA tenderness.   Musculoskeletal:         General: Normal range of motion.      Cervical back: Neck supple.   Skin:     General: Skin is warm.   Neurological:      General: No focal deficit present.      Mental Status: He is alert and oriented to person, place, and time. Mental status is at baseline.   Psychiatric:         Mood and Affect: Mood normal.         Behavior: Behavior normal.         Thought Content: Thought content normal.         Judgment: Judgment normal.         Laboratory:  Recent Labs     03/15/24  1605   WBC 15.6*   RBC 5.97   HEMOGLOBIN 17.3   HEMATOCRIT 50.7   MCV 84.9   MCH 29.0   MCHC 34.1   RDW 40.0   PLATELETCT 413   MPV 9.7     Recent Labs     03/15/24  1605   SODIUM 144   POTASSIUM 4.3   CHLORIDE 105   CO2 20   GLUCOSE 165*   BUN 21   CREATININE 1.40   CALCIUM 10.2     Recent Labs     03/15/24  1605   ALTSGPT 10   ASTSGOT 21   ALKPHOSPHAT 67   TBILIRUBIN  "0.8   LIPASE 13   GLUCOSE 165*         No results for input(s): \"NTPROBNP\" in the last 72 hours.      No results for input(s): \"TROPONINT\" in the last 72 hours.    Imaging:  CT-RENAL COLIC EVALUATION(A/P W/O)   Final Result         1.  7.8 mm proximal right ureteral stone resulting in right urinary outflow tract obstructive changes.   2.  Small fat-containing umbilical hernia   3.  Diverticulosis          no X-Ray or EKG requiring interpretation    Assessment/Plan:  Justification for Admission Status  I anticipate this patient is appropriate for observation status at this time because pt has nephrolithiasis    Patient will need a Med/Surg bed on MEDICAL service .  The need is secondary to nephrolithiasis.    * Nephrolithiasis- (present on admission)  Assessment & Plan  Found to have 7.8 mm nephrolithiasis resulting in obstructive changes  No evidence of UTI at this time  Fluids  Morphine  Urology consulted, patient will go for lithotripsy in a.m.        VTE prophylaxis: pharmacologic prophylaxis contraindicated due to OR in am   "

## 2024-03-16 NOTE — ED TRIAGE NOTES
.  Chief Complaint   Patient presents with    Abdominal Pain     Patient c/o RLQ pain radiating to back x 1 day.     N/V     X 1 day     Agree with triage assessment.  Last emesis at approx. 1600(lab draw), no blood.  No fever/chills.  Pain intermittent, increases with movement.

## 2024-03-19 ENCOUNTER — TELEPHONE (OUTPATIENT)
Dept: UROLOGY | Facility: MEDICAL CENTER | Age: 54
End: 2024-03-19

## 2024-03-20 ENCOUNTER — TELEPHONE (OUTPATIENT)
Dept: UROLOGY | Facility: MEDICAL CENTER | Age: 54
End: 2024-03-20

## 2024-03-25 ENCOUNTER — TELEPHONE (OUTPATIENT)
Dept: UROLOGY | Facility: MEDICAL CENTER | Age: 54
End: 2024-03-25